# Patient Record
(demographics unavailable — no encounter records)

---

## 2025-02-07 NOTE — HISTORY OF PRESENT ILLNESS
[FreeTextEntry1] : Transplant Hepatologist: Keisha Cross, DO Transplant Hepatology NP: Angelita Espinosa, [facility]  Evin Paniagua is a 58 y/o male with a PMH of Trigeminal neuralgia, T2DM, HLD, HTN, Hypothyroidism, Anemia, AUD, and decompensated ETOH cirrhosis, here for follow-up.   Patient established care after referral from Dr. Francis. First made aware of liver issue around 4-5 years ago when he was following with PCP and was told there was a c/f advanced liver disease based on labs and ultrasound. He was then referred to a Hepatologist (cannot recall who) and was told that a transplant was not required at the time -- he then stopped following with this Hepatologist because of geographical location and ended up establishing care with Dr. Francis. Of note, patient made aware cirrhosis was 2/2 to ETOH and advised to d/c alcohol use. First episode of decompensation in 2/2023 with upper GIB and underwent EGD w/ banding. No additional GIB since and is still undergoing EGD with repeat banding (last on 8/1/24). He had then remained stable but then developed HE in April/May of 2024 and was admitted at [facility]. HE sx now well controlled on Xifaxan -- Lactulose stopped by GI 2/2 GI sx. Denies prior LVP and not currently maintained on diuretics. No prior liver bx. No known family hx of liver disease. +Blood transfusions during GIB. Professional tattoos. Prior cigarette smoking, last 5 years ago. Denies current or prior IVDU. Last ETOH drink ~ 1 week ago with 1 pint of Ck Domingo. Now back in AA meetings. Has had periods of sobriety but continues struggling with relapse. Started ETOH use at 13 y/o and started regular ETOH use in late 40's with ~1L of liquor per day. Prior DUI. Heaviest weight ~180-185 lbs and long standing hx of T2DM and being overweight. No exercise of specific diet. Born in [facility] and came to [place] in [place]. Currently unemployed and previously worked in [employer] business. Lives at home with wife and mother in law. Independent in ADLs/iADLs.  - 4/25/24 MELD : Plts 98, INR 1, Na 133, sCr 0.8, Alb 3, Tbili 2,1, , AST/ALT 55/104, - 4/25/24 CT Abd: +Cirrhosis and small hypodensity in right hepatic lobe with punctate enhancement; +pHTN and no ascites - 5/9/24 MRI Abd: +Cirrhosis, 3 mm hyperintense focus in right hepatic lobe c/w flash filling hemangioma - 8/1/24 EGD: +PHG and Grade II EV s/p banding - 9/17/24 CLD w/u and viral screening: +RADHA, +HAV immunity, & mildly elevated IgG - 1/18/25 CT Abd/Pelvis: Cirrhosis w/ portal HTN, no HCC, patent vasculature and no ascites  Since last visit, patient had returned to [facility] and admits to ETOH relapse and poor medication compliance resulting in progressive HE -- hospitalized in [facility] and medically managed. Upon his return to [facility] he then went to [facility] due to AMS and progressive sarcopenia. Admitted at [facility] 1/2-1/6/25 for AMS 2/2 HE with hospitalization notable for medical management of HE and d/c home on Xifaxan + Lactulose. Additional admission at  1/18-1/21/25 after p/w acute on chronic pancreatitis and moderate constipation -- medically managed and d/c home with Hepatology f/u (unclear etiology based on documentation). Reported last ETOH use > 2 months ago (~11/2024) and had recently returned from [facility]. Additional ED visit on 1/27/25 with c/o facial pain from trigeminal neuralgia. Patient's allergies, medications, past medical, surgical, family, and social histories were reviewed and updated as appropriate. Seen in clinic today, accompanied by his wife. Reports that he feels well and is w/o complaints. Compliant with medications and reports much improved HE sx. Denies any recent fevers, chills, cough, lightheadedness, AMS, abdominal pain, n/v, diarrhea, hematochezia, hematemesis, and melena. Denies alcohol, tobacco, or recreational drug use.  MELD 10 / Blood Type O

## 2025-02-07 NOTE — HISTORY OF PRESENT ILLNESS
[FreeTextEntry1] : Transplant Hepatologist: Keisha Cross, DO Transplant Hepatology NP: Angelita Espinosa, [facility]  Evin Paniagua is a 56 y/o male with a PMH of Trigeminal neuralgia, T2DM, HLD, HTN, Hypothyroidism, Anemia, AUD, and decompensated ETOH cirrhosis, here for follow-up.   Patient established care after referral from Dr. Francis. First made aware of liver issue around 4-5 years ago when he was following with PCP and was told there was a c/f advanced liver disease based on labs and ultrasound. He was then referred to a Hepatologist (cannot recall who) and was told that a transplant was not required at the time -- he then stopped following with this Hepatologist because of geographical location and ended up establishing care with Dr. Francis. Of note, patient made aware cirrhosis was 2/2 to ETOH and advised to d/c alcohol use. First episode of decompensation in 2/2023 with upper GIB and underwent EGD w/ banding. No additional GIB since and is still undergoing EGD with repeat banding (last on 8/1/24). He had then remained stable but then developed HE in April/May of 2024 and was admitted at [facility]. HE sx now well controlled on Xifaxan -- Lactulose stopped by GI 2/2 GI sx. Denies prior LVP and not currently maintained on diuretics. No prior liver bx. No known family hx of liver disease. +Blood transfusions during GIB. Professional tattoos. Prior cigarette smoking, last 5 years ago. Denies current or prior IVDU. Last ETOH drink ~ 1 week ago with 1 pint of Ck Domingo. Now back in AA meetings. Has had periods of sobriety but continues struggling with relapse. Started ETOH use at 15 y/o and started regular ETOH use in late 40's with ~1L of liquor per day. Prior DUI. Heaviest weight ~180-185 lbs and long standing hx of T2DM and being overweight. No exercise of specific diet. Born in [facility] and came to [place] in [place]. Currently unemployed and previously worked in [employer] business. Lives at home with wife and mother in law. Independent in ADLs/iADLs.  - 4/25/24 MELD : Plts 98, INR 1, Na 133, sCr 0.8, Alb 3, Tbili 2,1, , AST/ALT 55/104, - 4/25/24 CT Abd: +Cirrhosis and small hypodensity in right hepatic lobe with punctate enhancement; +pHTN and no ascites - 5/9/24 MRI Abd: +Cirrhosis, 3 mm hyperintense focus in right hepatic lobe c/w flash filling hemangioma - 8/1/24 EGD: +PHG and Grade II EV s/p banding - 9/17/24 CLD w/u and viral screening: +RADHA, +HAV immunity, & mildly elevated IgG - 1/18/25 CT Abd/Pelvis: Cirrhosis w/ portal HTN, no HCC, patent vasculature and no ascites  Since last visit, patient had returned to [facility] and admits to ETOH relapse and poor medication compliance resulting in progressive HE -- hospitalized in [facility] and medically managed. Upon his return to [facility] he then went to [facility] due to AMS and progressive sarcopenia. Admitted at [facility] 1/2-1/6/25 for AMS 2/2 HE with hospitalization notable for medical management of HE and d/c home on Xifaxan + Lactulose. Additional admission at  1/18-1/21/25 after p/w acute on chronic pancreatitis and moderate constipation -- medically managed and d/c home with Hepatology f/u (unclear etiology based on documentation). Reported last ETOH use > 2 months ago (~11/2024) and had recently returned from [facility]. Additional ED visit on 1/27/25 with c/o facial pain from trigeminal neuralgia. Patient's allergies, medications, past medical, surgical, family, and social histories were reviewed and updated as appropriate. Seen in clinic today, accompanied by his wife. Reports that he feels well and is w/o complaints. Compliant with medications and reports much improved HE sx. Denies any recent fevers, chills, cough, lightheadedness, AMS, abdominal pain, n/v, diarrhea, hematochezia, hematemesis, and melena. Denies alcohol, tobacco, or recreational drug use.  MELD 10 / Blood Type O

## 2025-02-07 NOTE — ASSESSMENT
[FreeTextEntry1] :  Evin Amaro is a 55 y/o male with hx of decompensated cirrhosis thought to be 2/2 ETOH, here to establish care.  #Cirrhosis 2/2 metALD, well compensated, MELD 10 - 9/14/24 CLD w/u and viral screening unremarkable > etiology is 2/2 metALD - Patient educated on the diagnosis and natural history of cirrhosis, including the manifestations of End Stage Liver Disease (hepatic encephalopathy, HCC, ascites, variceal bleeding, sarcopenia, etc). The medical management and/or candidacy for liver transplantation was discussed. The importance of ETOH/smoking cessation, adequate nutrition, activity, reporting new symptoms, and compliance with lab schedule and clinic visits reviewed. I also emphasized the importance of biannual HCC screening and variceal screening as determined. Patient advised to avoid NSAIDs and common hepatotoxic medications. The role of other medications like statins and acetaminophen safety also discussed. Hepatitis serologies will be checked if not already, and patient will need appropriate HAV/HBV vaccination if indicated. - Euvolemic on exam, no indication for diuretics - Start daily weights and report > 2-3 lb/day weight gain - HE: well controlled, c/w Xifaxan 550 mg PO BID + Lactulose titrated to ~2-3 large BMs/day - Reinforced patient is not allowed to  or operate heavy machinery - pHTN: 8/1/24 EGD w/ +PHG and Grade II EV s/p banding > recent EGD cancelled, will refer back for EGD and c/w Carvedilol 3.125 mg PO BID - HCC: 1/18/25 CT Abd w/o c/f malignancy > update tumor markers and maintain biannual screening  - Reinforced importance of avoiding nephrotoxic agents - Counseled on following high protein and low Na diet in efforts of preventing sarcopenia - Reviewed risk for further liver decompensation with ETOH use; will refer to SBIRT program for RPP and c/w Acamprosate 333 mg PO TID with plan for dose increase pending updated labs - C/w Thiamine + Folic Acid supp - Reinforced importance of judicious use of OTC meds/supps/herbals - Reinforced s/s of liver decompensation and advised to report immediately - No indication for liver txp evaluation at this time given low MELD and ongoing ETOH use; will monitor clinical trajectory and refer if appropriate  Update labs today. RTC in 2 months. Patient seen and plan discussed with Dr. Cross.  Angelita Espinosa, MSN, [facility] Transplant Hepatology Nurse Practitioner [facility] [address] [place] [address] T: [phone] | F: [phone].

## 2025-02-07 NOTE — ASSESSMENT
[FreeTextEntry1] :  Evin Amaro is a 55 y/o male with hx of decompensated cirrhosis thought to be 2/2 ETOH, here to establish care.  #Cirrhosis 2/2 metALD, well compensated, MELD 10 - 9/14/24 CLD w/u and viral screening unremarkable > etiology is 2/2 metALD - Patient educated on the diagnosis and natural history of cirrhosis, including the manifestations of End Stage Liver Disease (hepatic encephalopathy, HCC, ascites, variceal bleeding, sarcopenia, etc). The medical management and/or candidacy for liver transplantation was discussed. The importance of ETOH/smoking cessation, adequate nutrition, activity, reporting new symptoms, and compliance with lab schedule and clinic visits reviewed. I also emphasized the importance of biannual HCC screening and variceal screening as determined. Patient advised to avoid NSAIDs and common hepatotoxic medications. The role of other medications like statins and acetaminophen safety also discussed. Hepatitis serologies will be checked if not already, and patient will need appropriate HAV/HBV vaccination if indicated. - Euvolemic on exam, no indication for diuretics - Start daily weights and report > 2-3 lb/day weight gain - HE: well controlled, c/w Xifaxan 550 mg PO BID + Lactulose titrated to ~2-3 large BMs/day - Reinforced patient is not allowed to  or operate heavy machinery - pHTN: 8/1/24 EGD w/ +PHG and Grade II EV s/p banding > recent EGD cancelled, will refer back for EGD and c/w Carvedilol 3.125 mg PO BID - HCC: 1/18/25 CT Abd w/o c/f malignancy > update tumor markers and maintain biannual screening  - Reinforced importance of avoiding nephrotoxic agents - Counseled on following high protein and low Na diet in efforts of preventing sarcopenia - Reviewed risk for further liver decompensation with ETOH use; will refer to SBIRT program for RPP and c/w Acamprosate 333 mg PO TID with plan for dose increase pending updated labs - C/w Thiamine + Folic Acid supp - Reinforced importance of judicious use of OTC meds/supps/herbals - Reinforced s/s of liver decompensation and advised to report immediately - No indication for liver txp evaluation at this time given low MELD and ongoing ETOH use; will monitor clinical trajectory and refer if appropriate  Update labs today. RTC in 2 months. Patient seen and plan discussed with Dr. Cross.  Angelita Espinosa, MSN, [facility] Transplant Hepatology Nurse Practitioner [facility] [address] [place] [address] T: [phone] | F: [phone].

## 2025-02-07 NOTE — REVIEW OF SYSTEMS
[Fever] : no fever [Chills] : no chills [Feeling Poorly] : not feeling poorly [Recent Weight Gain (___ Lbs)] : no recent weight gain [Chest Pain] : no chest pain [Palpitations] : no palpitations [Shortness Of Breath] : no shortness of breath [Cough] : no cough [Abdominal Pain] : no abdominal pain [Vomiting] : no vomiting [Constipation] : no constipation [Diarrhea] : no diarrhea [Melena] : no melena [Dysuria] : no dysuria [Limb Swelling] : no limb swelling [Itching] : no itching [Confused] : no confusion [Dizziness] : no dizziness